# Patient Record
Sex: MALE | Race: BLACK OR AFRICAN AMERICAN | Employment: FULL TIME | URBAN - NONMETROPOLITAN AREA
[De-identification: names, ages, dates, MRNs, and addresses within clinical notes are randomized per-mention and may not be internally consistent; named-entity substitution may affect disease eponyms.]

---

## 2018-03-23 ENCOUNTER — HOSPITAL ENCOUNTER (EMERGENCY)
Age: 33
Discharge: HOME | End: 2018-03-23
Payer: COMMERCIAL

## 2018-03-23 VITALS
TEMPERATURE: 99 F | WEIGHT: 240 LBS | HEIGHT: 76 IN | HEART RATE: 70 BPM | OXYGEN SATURATION: 98 % | BODY MASS INDEX: 29.22 KG/M2 | DIASTOLIC BLOOD PRESSURE: 75 MMHG | SYSTOLIC BLOOD PRESSURE: 135 MMHG | RESPIRATION RATE: 18 BRPM

## 2018-03-23 DIAGNOSIS — M25.561 ACUTE PAIN OF RIGHT KNEE: Primary | ICD-10-CM

## 2018-03-23 PROCEDURE — 99213 OFFICE O/P EST LOW 20 MIN: CPT | Performed by: NURSE PRACTITIONER

## 2018-03-23 PROCEDURE — 99212 OFFICE O/P EST SF 10 MIN: CPT

## 2018-03-23 RX ORDER — CYCLOBENZAPRINE HCL 5 MG
5 TABLET ORAL 3 TIMES DAILY PRN
COMMUNITY
End: 2018-04-03 | Stop reason: ALTCHOICE

## 2018-03-23 RX ORDER — PREDNISONE 20 MG/1
20 TABLET ORAL 2 TIMES DAILY
Qty: 10 TABLET | Refills: 0 | Status: SHIPPED | OUTPATIENT
Start: 2018-03-23 | End: 2018-03-28

## 2018-03-23 ASSESSMENT — ENCOUNTER SYMPTOMS
ABDOMINAL PAIN: 0
RHINORRHEA: 0
EYE PAIN: 0
COLOR CHANGE: 0
NAUSEA: 0
SORE THROAT: 0
COUGH: 0
CONSTIPATION: 0
VOMITING: 0
SHORTNESS OF BREATH: 0
WHEEZING: 0
DIARRHEA: 0
STRIDOR: 0
BACK PAIN: 0
EYE DISCHARGE: 0

## 2018-03-23 NOTE — ED PROVIDER NOTES
[address]  Urgent Care Encounter      CHIEF COMPLAINT       Chief Complaint   Patient presents with    Knee Pain     right lateral       Nurses Notes reviewed and I agree except as noted in the HPI. HISTORY OF PRESENT ILLNESS   Lito Hines is a 34 y.o. male who presents With an acute exacerbation of right knee pain after initial injury February 11. He was seen at [facility] emergency department and was x-rayed with no findings. He states 2 days ago his knee began to feel swollen and hurting more. 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 REVIEW OF SYSTEMS     Review of Systems   Constitutional: Negative for activity change, appetite change, chills, fatigue and fever. HENT: Negative for congestion, ear pain, rhinorrhea and sore throat. Eyes: Negative for pain and discharge. Respiratory: Negative for cough, shortness of breath, wheezing and stridor. Cardiovascular: Negative for chest pain. Gastrointestinal: Negative for abdominal pain, constipation, diarrhea, nausea and vomiting. Genitourinary: Negative for dysuria, flank pain and frequency. Musculoskeletal: Positive for arthralgias. Negative for back pain, myalgias and neck pain. Skin: Negative for color change and rash. Allergic/Immunologic: Negative for immunocompromised state. Neurological: Negative for dizziness, weakness and headaches. Psychiatric/Behavioral: Negative. PAST MEDICAL HISTORY   History reviewed. No pertinent past medical history. SURGICAL HISTORY     Patient  has a past surgical history that includes knee surgery. CURRENT MEDICATIONS       Previous Medications    CYCLOBENZAPRINE (FLEXERIL) 5 MG TABLET    Take 5 mg by mouth 3 times daily as needed for Muscle spasms       ALLERGIES     Patient is has No Known Allergies. FAMILY HISTORY     Patient's family history includes Heart Disease in his father; Kidney Disease in his mother.     SOCIAL HISTORY

## 2018-03-23 NOTE — ED TRIAGE NOTES
Pt to urgent care with complaint of right lateral knee pain. Pt states it has been ongoing for about 1 month since he fell on ice and had negative xray per PCP. Pt states pain recently worsened when he twisted his knee.

## 2018-04-03 ENCOUNTER — HOSPITAL ENCOUNTER (EMERGENCY)
Age: 33
Discharge: HOME | End: 2018-04-03
Attending: NURSE PRACTITIONER
Payer: COMMERCIAL

## 2018-04-03 VITALS
WEIGHT: 230 LBS | TEMPERATURE: 98.2 F | HEIGHT: 76 IN | SYSTOLIC BLOOD PRESSURE: 136 MMHG | HEART RATE: 86 BPM | BODY MASS INDEX: 28.01 KG/M2 | DIASTOLIC BLOOD PRESSURE: 89 MMHG | RESPIRATION RATE: 16 BRPM | OXYGEN SATURATION: 97 %

## 2018-04-03 DIAGNOSIS — M25.561 ACUTE PAIN OF RIGHT KNEE: Primary | ICD-10-CM

## 2018-04-03 PROCEDURE — 99212 OFFICE O/P EST SF 10 MIN: CPT

## 2018-04-03 PROCEDURE — 99213 OFFICE O/P EST LOW 20 MIN: CPT | Performed by: NURSE PRACTITIONER

## 2018-04-03 ASSESSMENT — PAIN DESCRIPTION - DESCRIPTORS: DESCRIPTORS: ACHING

## 2018-04-03 ASSESSMENT — PAIN DESCRIPTION - PAIN TYPE: TYPE: ACUTE PAIN

## 2018-04-03 ASSESSMENT — PAIN DESCRIPTION - PROGRESSION: CLINICAL_PROGRESSION: GRADUALLY WORSENING

## 2018-04-03 ASSESSMENT — PAIN SCALES - GENERAL: PAINLEVEL_OUTOF10: 10

## 2018-04-03 ASSESSMENT — PAIN DESCRIPTION - ONSET: ONSET: GRADUAL

## 2018-04-03 ASSESSMENT — PAIN DESCRIPTION - ORIENTATION: ORIENTATION: RIGHT

## 2018-04-03 ASSESSMENT — PAIN DESCRIPTION - FREQUENCY: FREQUENCY: CONTINUOUS

## 2025-01-30 ENCOUNTER — HOSPITAL ENCOUNTER (EMERGENCY)
Age: 40
Discharge: HOME | End: 2025-01-30
Payer: COMMERCIAL

## 2025-01-30 ENCOUNTER — APPOINTMENT (OUTPATIENT)
Dept: GENERAL RADIOLOGY | Age: 40
End: 2025-01-30
Payer: COMMERCIAL

## 2025-01-30 VITALS
WEIGHT: 315 LBS | RESPIRATION RATE: 18 BRPM | HEART RATE: 98 BPM | OXYGEN SATURATION: 98 % | SYSTOLIC BLOOD PRESSURE: 134 MMHG | TEMPERATURE: 98.1 F | BODY MASS INDEX: 39.93 KG/M2 | DIASTOLIC BLOOD PRESSURE: 91 MMHG

## 2025-01-30 DIAGNOSIS — S46.912A STRAIN OF LEFT SHOULDER, INITIAL ENCOUNTER: Primary | ICD-10-CM

## 2025-01-30 PROCEDURE — 99203 OFFICE O/P NEW LOW 30 MIN: CPT | Performed by: NURSE PRACTITIONER

## 2025-01-30 PROCEDURE — 73030 X-RAY EXAM OF SHOULDER: CPT

## 2025-01-30 PROCEDURE — 99203 OFFICE O/P NEW LOW 30 MIN: CPT

## 2025-01-30 RX ORDER — IBUPROFEN 200 MG
400 TABLET ORAL EVERY 6 HOURS PRN
COMMUNITY

## 2025-01-30 ASSESSMENT — ENCOUNTER SYMPTOMS
DIARRHEA: 0
SORE THROAT: 0
RHINORRHEA: 0
COUGH: 0
CHEST TIGHTNESS: 0
VOMITING: 0
NAUSEA: 0
SHORTNESS OF BREATH: 0

## 2025-01-30 ASSESSMENT — PAIN DESCRIPTION - PAIN TYPE: TYPE: ACUTE PAIN

## 2025-01-30 ASSESSMENT — PAIN DESCRIPTION - ORIENTATION: ORIENTATION: LEFT

## 2025-01-30 ASSESSMENT — PAIN SCALES - GENERAL: PAINLEVEL_OUTOF10: 10

## 2025-01-30 ASSESSMENT — PAIN - FUNCTIONAL ASSESSMENT: PAIN_FUNCTIONAL_ASSESSMENT: 0-10

## 2025-01-30 ASSESSMENT — PAIN DESCRIPTION - LOCATION: LOCATION: SHOULDER

## 2025-01-30 NOTE — ED PROVIDER NOTES
[facility] EMERGENCY DEPARTMENT  Urgent Care Encounter       CHIEF COMPLAINT       Chief Complaint   Patient presents with    Shoulder Pain       Nurses Notes reviewed and I agree except as noted in the HPI.  HISTORY OF PRESENT ILLNESS   Charles Maher is a 39 y.o. male who presents to the [facility] for evaluation of left shoulder pain.  He reports that he believes he may have injured his left shoulder while practicing [employer].  He reports that he has had this pain intermittently for several months.  He reports that it did acutely start while at this practice.  He does have complete range of motion of the left shoulder.  He does have point tenderness.  No distal paresthesia.    The history is provided by the patient. No  was used.       REVIEW OF SYSTEMS     Review of Systems   Constitutional:  Negative for activity change, appetite change, chills, fatigue and fever.   HENT:  Negative for ear discharge, ear pain, rhinorrhea and sore throat.    Respiratory:  Negative for cough, chest tightness and shortness of breath.    Cardiovascular:  Negative for chest pain.   Gastrointestinal:  Negative for diarrhea, nausea and vomiting.   Genitourinary:  Negative for dysuria.   Musculoskeletal:  Positive for arthralgias.   Skin:  Negative for rash.   Allergic/Immunologic: Negative for environmental allergies and food allergies.   Neurological:  Negative for dizziness and headaches.       PAST MEDICAL HISTORY   History reviewed. No pertinent past medical history.    SURGICALHISTORY     Patient  has a past surgical history that includes knee surgery.    CURRENT MEDICATIONS       Discharge Medication List as of 1/30/2025 12:17 PM        CONTINUE these medications which have NOT CHANGED    Details   ibuprofen (ADVIL;MOTRIN) 200 MG tablet Take 2 tablets by mouth every 6 hours as needed for PainHistorical Med             ALLERGIES     Patient is has No Known

## 2025-02-14 ENCOUNTER — OFFICE VISIT (OUTPATIENT)
Age: 40
End: 2025-02-14
Payer: COMMERCIAL

## 2025-02-14 VITALS
HEIGHT: 76 IN | OXYGEN SATURATION: 94 % | HEART RATE: 97 BPM | SYSTOLIC BLOOD PRESSURE: 160 MMHG | BODY MASS INDEX: 38.36 KG/M2 | WEIGHT: 315 LBS | DIASTOLIC BLOOD PRESSURE: 98 MMHG | TEMPERATURE: 98.4 F

## 2025-02-14 DIAGNOSIS — I10 PRIMARY HYPERTENSION: Primary | ICD-10-CM

## 2025-02-14 DIAGNOSIS — G89.29 CHRONIC PAIN IN LEFT SHOULDER: ICD-10-CM

## 2025-02-14 DIAGNOSIS — M25.512 CHRONIC PAIN IN LEFT SHOULDER: ICD-10-CM

## 2025-02-14 PROCEDURE — 3077F SYST BP >= 140 MM HG: CPT | Performed by: NURSE PRACTITIONER

## 2025-02-14 PROCEDURE — 99204 OFFICE O/P NEW MOD 45 MIN: CPT | Performed by: NURSE PRACTITIONER

## 2025-02-14 PROCEDURE — 3080F DIAST BP >= 90 MM HG: CPT | Performed by: NURSE PRACTITIONER

## 2025-02-14 RX ORDER — LOSARTAN POTASSIUM 25 MG/1
25 TABLET ORAL DAILY
Qty: 90 TABLET | Refills: 0 | Status: SHIPPED | OUTPATIENT
Start: 2025-02-14

## 2025-02-14 SDOH — ECONOMIC STABILITY: FOOD INSECURITY: WITHIN THE PAST 12 MONTHS, THE FOOD YOU BOUGHT JUST DIDN'T LAST AND YOU DIDN'T HAVE MONEY TO GET MORE.: NEVER TRUE

## 2025-02-14 SDOH — ECONOMIC STABILITY: FOOD INSECURITY: WITHIN THE PAST 12 MONTHS, YOU WORRIED THAT YOUR FOOD WOULD RUN OUT BEFORE YOU GOT MONEY TO BUY MORE.: NEVER TRUE

## 2025-02-14 ASSESSMENT — ENCOUNTER SYMPTOMS
SHORTNESS OF BREATH: 0
NAUSEA: 0

## 2025-02-14 ASSESSMENT — PATIENT HEALTH QUESTIONNAIRE - PHQ9
SUM OF ALL RESPONSES TO PHQ QUESTIONS 1-9: 0
1. LITTLE INTEREST OR PLEASURE IN DOING THINGS: NOT AT ALL
2. FEELING DOWN, DEPRESSED OR HOPELESS: NOT AT ALL
SUM OF ALL RESPONSES TO PHQ9 QUESTIONS 1 & 2: 0
SUM OF ALL RESPONSES TO PHQ QUESTIONS 1-9: 0

## 2025-02-14 NOTE — PATIENT INSTRUCTIONS
[place] [employer] - Financial Resources*  (Please call [address]/211 if need more resources)    Utility  [facility] Community Action Partnership:  What they do: Help pay rent, utilities & internet bills.  Phone Number: 778.637.4737  Website: https://[employer]/emergency-services/rent-assistance    [facility] [place]:  What they do: They offer Utility assistance  Phone Number: 207.847.9296   Website: https://[employer] [facility]/[place]/equip-families    Medical  Alzheimer's Association: 606.542.7368  American Cancer Society: 861.383.1752  American Heart Association: 234.304.2821  American Lung Association: 265.424.7075  Arthritis Foundation: [phone]  [place] of Services for Visually Impaired: [phone]  [facility] [place]: [phone]     [facility]:  What they offer: Assist in finding resources to help pay hospital bills.  Phone Number: 1-671.538.7256  Website: https://www[employer])/patient-resources/financial-assistance    [facility] [employer] Family Services:  What they offer: Medical coverage assistance for children, pregnant women, elderly, individuals with disabilities and those looking for long term care and/or in home waiver care.   Phone Number:  183.960.1341  Website: https://[employer]/services/medical-assistance    [facility] Agency on Aging:  What they offer: Aging and disability resource center, care management/coordination, transportation, wellness and prevention.  Phone Number: 217.426.3938          [place] [employer]:  What they offer: Temporary cash assistance to families to pay immediate needs while the adults of the families prepare and search for jobs.   Phone Number: 589.317.6480  Website: https://[employer]/services/cash-assistance                                       [place] [employer] Resources*  (Call [employer]/211 if need more resources.)    [facility] [place] on Aging:  What they offer:  Provides transportation to appointments, Congregate meal sites, grocery 
years old $0.75, under 6 free.   Phone Number: 275.305.9059  Website: https://www.[employer]/[facility]   [employer] transit:  What they offer: Transportation throughout the state of [place] and [place]. Available for Ambulatory and wheelchair bound services.  Cost: [place] Medicaid and Private pay   Phone Number: 542.750.8364  Website: https://www.[employer]/smart-start-transit.html   [employer]:  What they offer: Transportation for [facility] only.   Phone Number: 716.104.8225  Website: https://www.[facility]                    
none

## 2025-02-14 NOTE — PROGRESS NOTES
[facility] PROFESSIONAL SERVS  [facility]  [address] [address]  Dept: [phone]  Dept Fax: [phone]  Loc: [phone]        CHIEF COMPLAINT     Chief Complaint   Patient presents with    Other     Left shoulder pain       Nurses Notes reviewed and I agree except as noted in the HPI.  HISTORY OF PRESENT ILLNESS   Charles Maher is a 39 y.o. male who presents with complaints of chronic left shoulder pain.  This is something that has been bothering him daily for the past year.  He denies any known specific injury.  He does admit to performing jujitsu and wrestling often.  He has been taking daily ibuprofen and tried ice and heat.  There has been no relief.  He reports his shoulder does not bother him when he lifts weights.  However, whenever he stops lifting his shoulder begins to bother him specifically at nighttime greater than daytime.  Denies any numbness, tingling, or radiation of pain into the elbow or fingertips.    In addition, on examination today his blood pressure is noted to be 160/98.  Patient does have concerns with his high blood pressure.  He would like to discuss treatment options.  He is here to establish care.    REVIEW OF SYSTEMS     Review of Systems   Constitutional:  Negative for activity change.   Respiratory:  Negative for shortness of breath.    Cardiovascular:  Negative for chest pain.   Gastrointestinal:  Negative for nausea.   Endocrine: Negative for polydipsia and polyuria.   Musculoskeletal:  Positive for arthralgias (left shoulder). Negative for joint swelling and myalgias.   Skin:  Positive for rash (left upper arm).   Neurological:  Negative for weakness, numbness and headaches.   Hematological:  Does not bruise/bleed easily.       PAST MEDICAL HISTORY   History reviewed. No pertinent past medical history.     SURGICAL HISTORY     Patient  has a past surgical history that includes knee surgery.    CURRENT MEDICATIONS

## 2025-04-10 ENCOUNTER — TELEPHONE (OUTPATIENT)
Age: 40
End: 2025-04-10

## 2025-04-14 ENCOUNTER — TELEPHONE (OUTPATIENT)
Age: 40
End: 2025-04-14

## 2025-04-17 ENCOUNTER — TELEPHONE (OUTPATIENT)
Age: 40
End: 2025-04-17

## 2025-04-21 ENCOUNTER — TELEPHONE (OUTPATIENT)
Age: 40
End: 2025-04-21

## 2025-05-07 ENCOUNTER — TELEPHONE (OUTPATIENT)
Age: 40
End: 2025-05-07

## 2025-05-09 ENCOUNTER — TELEPHONE (OUTPATIENT)
Age: 40
End: 2025-05-09

## 2025-05-09 NOTE — TELEPHONE ENCOUNTER
Called patient regarding missed appointment and MRI that is not scheduled/completed. Unable to leave voicemail.